# Patient Record
Sex: MALE | Race: WHITE | NOT HISPANIC OR LATINO | Employment: STUDENT | URBAN - METROPOLITAN AREA
[De-identification: names, ages, dates, MRNs, and addresses within clinical notes are randomized per-mention and may not be internally consistent; named-entity substitution may affect disease eponyms.]

---

## 2019-02-01 ENCOUNTER — INITIAL CONSULT (OUTPATIENT)
Dept: OPHTHALMOLOGY | Facility: CLINIC | Age: 29
End: 2019-02-01

## 2019-02-01 DIAGNOSIS — H18.603 KERATOCONUS OF BOTH EYES: ICD-10-CM

## 2019-02-01 DIAGNOSIS — M35.01 KERATITIS SICCA: Primary | ICD-10-CM

## 2019-02-01 PROCEDURE — 92004 PR EYE EXAM, NEW PATIENT,COMPREHESV: ICD-10-PCS | Mod: S$PBB,,, | Performed by: OPHTHALMOLOGY

## 2019-02-01 PROCEDURE — 99999 PR PBB SHADOW E&M-NEW PATIENT-LVL II: CPT | Mod: PBBFAC,,, | Performed by: OPHTHALMOLOGY

## 2019-02-01 PROCEDURE — 92004 COMPRE OPH EXAM NEW PT 1/>: CPT | Mod: S$PBB,,, | Performed by: OPHTHALMOLOGY

## 2019-02-01 PROCEDURE — 99999 PR PBB SHADOW E&M-NEW PATIENT-LVL II: ICD-10-PCS | Mod: PBBFAC,,, | Performed by: OPHTHALMOLOGY

## 2019-02-01 PROCEDURE — 99202 OFFICE O/P NEW SF 15 MIN: CPT | Mod: PBBFAC | Performed by: OPHTHALMOLOGY

## 2019-02-01 NOTE — PROGRESS NOTES
HPI     Pt is here today for 3rd opinion of his dry eye issues. Pt has seen   multiple doctors all [place] for this issues. Pt reports seeing   over 20 different doctors and no one knows why he does not produce tears.   Pt states that his vision is good but his eyes always feel like he has   sand in them.     Pt confirms taking   Serum tears QID OU- Started 12/4/18. ( not working as of now)    Pt has tried restasis and xiidra both for 6 months with no relief so he no   longer takes them. Pt also tried every over the counter tears.     Ocular hx  CXL OU 2017 [place]  Intact rings OS  Punctal Plugs OU upper and lower      Last edited by Sarah Pearson on 2/1/2019 11:38 AM. (History)            Assessment /Plan     For exam results, see Encounter Report.    Keratitis sicca    Keratoconus of both eyes      S/p CXL OU and INTACS OS  Hx tried everything for LATASHA, but still symptomatic  Small tear lake but no keratitis   Try TrueTear

## 2025-09-03 ENCOUNTER — TELEPHONE (OUTPATIENT)
Dept: PRIMARY CARE CLINIC | Facility: CLINIC | Age: 35
End: 2025-09-03
Payer: COMMERCIAL